# Patient Record
Sex: FEMALE | Race: BLACK OR AFRICAN AMERICAN | NOT HISPANIC OR LATINO | URBAN - METROPOLITAN AREA
[De-identification: names, ages, dates, MRNs, and addresses within clinical notes are randomized per-mention and may not be internally consistent; named-entity substitution may affect disease eponyms.]

---

## 2023-06-09 ENCOUNTER — TELEPHONE (OUTPATIENT)
Dept: PRIMARY CARE | Facility: CLINIC | Age: 43
End: 2023-06-09
Payer: COMMERCIAL

## 2023-06-09 DIAGNOSIS — E55.9 VITAMIN D DEFICIENCY: ICD-10-CM

## 2023-06-09 DIAGNOSIS — Z00.00 ROUTINE GENERAL MEDICAL EXAMINATION AT A HEALTH CARE FACILITY: Primary | ICD-10-CM

## 2023-06-22 ENCOUNTER — CLINICAL SUPPORT (OUTPATIENT)
Dept: PRIMARY CARE | Facility: CLINIC | Age: 43
End: 2023-06-22
Payer: COMMERCIAL

## 2023-06-22 DIAGNOSIS — E55.9 VITAMIN D DEFICIENCY: ICD-10-CM

## 2023-06-22 DIAGNOSIS — Z00.00 ROUTINE GENERAL MEDICAL EXAMINATION AT A HEALTH CARE FACILITY: ICD-10-CM

## 2023-06-22 LAB
ALANINE AMINOTRANSFERASE (SGPT) (U/L) IN SER/PLAS: 22 U/L (ref 7–45)
ALBUMIN (G/DL) IN SER/PLAS: 4.2 G/DL (ref 3.4–5)
ALKALINE PHOSPHATASE (U/L) IN SER/PLAS: 92 U/L (ref 33–110)
ANION GAP IN SER/PLAS: 17 MMOL/L (ref 10–20)
ASPARTATE AMINOTRANSFERASE (SGOT) (U/L) IN SER/PLAS: 24 U/L (ref 9–39)
BILIRUBIN TOTAL (MG/DL) IN SER/PLAS: 0.3 MG/DL (ref 0–1.2)
CALCIDIOL (25 OH VITAMIN D3) (NG/ML) IN SER/PLAS: 16 NG/ML
CALCIUM (MG/DL) IN SER/PLAS: 9.5 MG/DL (ref 8.6–10.6)
CARBON DIOXIDE, TOTAL (MMOL/L) IN SER/PLAS: 23 MMOL/L (ref 21–32)
CHLORIDE (MMOL/L) IN SER/PLAS: 107 MMOL/L (ref 98–107)
CHOLESTEROL (MG/DL) IN SER/PLAS: 178 MG/DL (ref 0–199)
CHOLESTEROL IN HDL (MG/DL) IN SER/PLAS: 51.7 MG/DL
CHOLESTEROL/HDL RATIO: 3.4
COBALAMIN (VITAMIN B12) (PG/ML) IN SER/PLAS: 345 PG/ML (ref 211–911)
CREATININE (MG/DL) IN SER/PLAS: 0.7 MG/DL (ref 0.5–1.05)
ERYTHROCYTE DISTRIBUTION WIDTH (RATIO) BY AUTOMATED COUNT: 19.7 % (ref 11.5–14.5)
ERYTHROCYTE MEAN CORPUSCULAR HEMOGLOBIN CONCENTRATION (G/DL) BY AUTOMATED: 30.2 G/DL (ref 32–36)
ERYTHROCYTE MEAN CORPUSCULAR VOLUME (FL) BY AUTOMATED COUNT: 75 FL (ref 80–100)
ERYTHROCYTES (10*6/UL) IN BLOOD BY AUTOMATED COUNT: 5.14 X10E12/L (ref 4–5.2)
GFR FEMALE: >90 ML/MIN/1.73M2
GLUCOSE (MG/DL) IN SER/PLAS: 93 MG/DL (ref 74–99)
HEMATOCRIT (%) IN BLOOD BY AUTOMATED COUNT: 38.8 % (ref 36–46)
HEMOGLOBIN (G/DL) IN BLOOD: 11.7 G/DL (ref 12–16)
LDL: 108 MG/DL (ref 0–99)
LEUKOCYTES (10*3/UL) IN BLOOD BY AUTOMATED COUNT: 8.9 X10E9/L (ref 4.4–11.3)
NRBC (PER 100 WBCS) BY AUTOMATED COUNT: 0 /100 WBC (ref 0–0)
PLATELETS (10*3/UL) IN BLOOD AUTOMATED COUNT: 557 X10E9/L (ref 150–450)
POTASSIUM (MMOL/L) IN SER/PLAS: 4.6 MMOL/L (ref 3.5–5.3)
PROTEIN TOTAL: 7.8 G/DL (ref 6.4–8.2)
SODIUM (MMOL/L) IN SER/PLAS: 142 MMOL/L (ref 136–145)
THYROTROPIN (MIU/L) IN SER/PLAS BY DETECTION LIMIT <= 0.05 MIU/L: 1.25 MIU/L (ref 0.44–3.98)
TRIGLYCERIDE (MG/DL) IN SER/PLAS: 90 MG/DL (ref 0–149)
UREA NITROGEN (MG/DL) IN SER/PLAS: 7 MG/DL (ref 6–23)
VLDL: 18 MG/DL (ref 0–40)

## 2023-06-22 PROCEDURE — 84443 ASSAY THYROID STIM HORMONE: CPT

## 2023-06-22 PROCEDURE — 85027 COMPLETE CBC AUTOMATED: CPT

## 2023-06-22 PROCEDURE — 82306 VITAMIN D 25 HYDROXY: CPT

## 2023-06-22 PROCEDURE — 80053 COMPREHEN METABOLIC PANEL: CPT

## 2023-06-22 PROCEDURE — 82607 VITAMIN B-12: CPT

## 2023-06-22 PROCEDURE — 83036 HEMOGLOBIN GLYCOSYLATED A1C: CPT

## 2023-06-22 PROCEDURE — 80061 LIPID PANEL: CPT

## 2023-06-23 LAB
ESTIMATED AVERAGE GLUCOSE FOR HBA1C: 123 MG/DL
HEMOGLOBIN A1C/HEMOGLOBIN TOTAL IN BLOOD: 5.9 %

## 2023-07-20 ENCOUNTER — OFFICE VISIT (OUTPATIENT)
Dept: PRIMARY CARE | Facility: CLINIC | Age: 43
End: 2023-07-20
Payer: COMMERCIAL

## 2023-07-20 VITALS
DIASTOLIC BLOOD PRESSURE: 100 MMHG | HEART RATE: 92 BPM | BODY MASS INDEX: 51.91 KG/M2 | OXYGEN SATURATION: 96 % | HEIGHT: 63 IN | SYSTOLIC BLOOD PRESSURE: 159 MMHG | WEIGHT: 293 LBS

## 2023-07-20 DIAGNOSIS — Z00.00 ANNUAL PHYSICAL EXAM: Primary | ICD-10-CM

## 2023-07-20 DIAGNOSIS — D64.9 ANEMIA, UNSPECIFIED TYPE: ICD-10-CM

## 2023-07-20 DIAGNOSIS — Z12.11 ENCOUNTER FOR SCREENING FOR MALIGNANT NEOPLASM OF COLON: ICD-10-CM

## 2023-07-20 DIAGNOSIS — R03.0 ELEVATED BP WITHOUT DIAGNOSIS OF HYPERTENSION: ICD-10-CM

## 2023-07-20 DIAGNOSIS — R73.03 PREDIABETES: ICD-10-CM

## 2023-07-20 DIAGNOSIS — E55.9 VITAMIN D DEFICIENCY: ICD-10-CM

## 2023-07-20 DIAGNOSIS — E66.01 CLASS 3 SEVERE OBESITY DUE TO EXCESS CALORIES WITH SERIOUS COMORBIDITY AND BODY MASS INDEX (BMI) OF 50.0 TO 59.9 IN ADULT (MULTI): ICD-10-CM

## 2023-07-20 DIAGNOSIS — Z12.31 VISIT FOR SCREENING MAMMOGRAM: ICD-10-CM

## 2023-07-20 DIAGNOSIS — Z23 ENCOUNTER FOR IMMUNIZATION: ICD-10-CM

## 2023-07-20 LAB
BASOPHILS (10*3/UL) IN BLOOD BY AUTOMATED COUNT: 0.05 X10E9/L (ref 0–0.1)
BASOPHILS/100 LEUKOCYTES IN BLOOD BY AUTOMATED COUNT: 0.5 % (ref 0–2)
EOSINOPHILS (10*3/UL) IN BLOOD BY AUTOMATED COUNT: 0.23 X10E9/L (ref 0–0.7)
EOSINOPHILS/100 LEUKOCYTES IN BLOOD BY AUTOMATED COUNT: 2.4 % (ref 0–6)
ERYTHROCYTE DISTRIBUTION WIDTH (RATIO) BY AUTOMATED COUNT: 19 % (ref 11.5–14.5)
ERYTHROCYTE MEAN CORPUSCULAR HEMOGLOBIN CONCENTRATION (G/DL) BY AUTOMATED: 30 G/DL (ref 32–36)
ERYTHROCYTE MEAN CORPUSCULAR VOLUME (FL) BY AUTOMATED COUNT: 75 FL (ref 80–100)
ERYTHROCYTES (10*6/UL) IN BLOOD BY AUTOMATED COUNT: 5.06 X10E12/L (ref 4–5.2)
FERRITIN (UG/LL) IN SER/PLAS: 50 UG/L (ref 8–150)
HEMATOCRIT (%) IN BLOOD BY AUTOMATED COUNT: 38 % (ref 36–46)
HEMOGLOBIN (G/DL) IN BLOOD: 11.4 G/DL (ref 12–16)
IMMATURE GRANULOCYTES/100 LEUKOCYTES IN BLOOD BY AUTOMATED COUNT: 0.3 % (ref 0–0.9)
IRON (UG/DL) IN SER/PLAS: 32 UG/DL (ref 35–150)
IRON BINDING CAPACITY (UG/DL) IN SER/PLAS: 429 UG/DL (ref 240–445)
IRON SATURATION (%) IN SER/PLAS: 7 % (ref 25–45)
LEUKOCYTES (10*3/UL) IN BLOOD BY AUTOMATED COUNT: 9.6 X10E9/L (ref 4.4–11.3)
LYMPHOCYTES (10*3/UL) IN BLOOD BY AUTOMATED COUNT: 2.14 X10E9/L (ref 1.2–4.8)
LYMPHOCYTES/100 LEUKOCYTES IN BLOOD BY AUTOMATED COUNT: 22.3 % (ref 13–44)
MONOCYTES (10*3/UL) IN BLOOD BY AUTOMATED COUNT: 0.55 X10E9/L (ref 0.1–1)
MONOCYTES/100 LEUKOCYTES IN BLOOD BY AUTOMATED COUNT: 5.7 % (ref 2–10)
NEUTROPHILS (10*3/UL) IN BLOOD BY AUTOMATED COUNT: 6.6 X10E9/L (ref 1.2–7.7)
NEUTROPHILS/100 LEUKOCYTES IN BLOOD BY AUTOMATED COUNT: 68.8 % (ref 40–80)
NRBC (PER 100 WBCS) BY AUTOMATED COUNT: 0 /100 WBC (ref 0–0)
PLATELETS (10*3/UL) IN BLOOD AUTOMATED COUNT: 523 X10E9/L (ref 150–450)

## 2023-07-20 PROCEDURE — 99396 PREV VISIT EST AGE 40-64: CPT | Performed by: FAMILY MEDICINE

## 2023-07-20 PROCEDURE — 84165 PROTEIN E-PHORESIS SERUM: CPT

## 2023-07-20 PROCEDURE — 85025 COMPLETE CBC W/AUTO DIFF WBC: CPT

## 2023-07-20 PROCEDURE — 84165 PROTEIN E-PHORESIS SERUM: CPT | Performed by: FAMILY MEDICINE

## 2023-07-20 PROCEDURE — 90715 TDAP VACCINE 7 YRS/> IM: CPT | Performed by: FAMILY MEDICINE

## 2023-07-20 PROCEDURE — 1036F TOBACCO NON-USER: CPT | Performed by: FAMILY MEDICINE

## 2023-07-20 PROCEDURE — 83550 IRON BINDING TEST: CPT

## 2023-07-20 PROCEDURE — 83540 ASSAY OF IRON: CPT

## 2023-07-20 PROCEDURE — 82728 ASSAY OF FERRITIN: CPT

## 2023-07-20 PROCEDURE — 83883 ASSAY NEPHELOMETRY NOT SPEC: CPT

## 2023-07-20 PROCEDURE — 3008F BODY MASS INDEX DOCD: CPT | Performed by: FAMILY MEDICINE

## 2023-07-20 PROCEDURE — 90471 IMMUNIZATION ADMIN: CPT | Performed by: FAMILY MEDICINE

## 2023-07-20 PROCEDURE — 84165 PROTEIN E-PHORESIS SERUM: CPT | Performed by: PATHOLOGY

## 2023-07-20 RX ORDER — SEMAGLUTIDE 0.25 MG/.5ML
0.25 INJECTION, SOLUTION SUBCUTANEOUS
Qty: 2 ML | Refills: 0 | Status: SHIPPED | OUTPATIENT
Start: 2023-07-20 | End: 2023-07-25 | Stop reason: DRUGHIGH

## 2023-07-20 ASSESSMENT — ENCOUNTER SYMPTOMS
OCCASIONAL FEELINGS OF UNSTEADINESS: 0
DEPRESSION: 0
LOSS OF SENSATION IN FEET: 0

## 2023-07-20 ASSESSMENT — COLUMBIA-SUICIDE SEVERITY RATING SCALE - C-SSRS
6. HAVE YOU EVER DONE ANYTHING, STARTED TO DO ANYTHING, OR PREPARED TO DO ANYTHING TO END YOUR LIFE?: NO
1. IN THE PAST MONTH, HAVE YOU WISHED YOU WERE DEAD OR WISHED YOU COULD GO TO SLEEP AND NOT WAKE UP?: NO
2. HAVE YOU ACTUALLY HAD ANY THOUGHTS OF KILLING YOURSELF?: NO

## 2023-07-20 ASSESSMENT — PATIENT HEALTH QUESTIONNAIRE - PHQ9
SUM OF ALL RESPONSES TO PHQ9 QUESTIONS 1 AND 2: 0
1. LITTLE INTEREST OR PLEASURE IN DOING THINGS: NOT AT ALL
2. FEELING DOWN, DEPRESSED OR HOPELESS: NOT AT ALL

## 2023-07-20 NOTE — PROGRESS NOTES
"Subjective   Patient ID: Ernestina Naylor is a 43 y.o. female who presents for Annual Exam (Painful Cramps).    Last Annual Physical: Jan 2021   Last Dental Visit: No recent dental visit   Last Eye exam: No recent eye exam  Hearing Concerns: No   Diet: Well- balanced   Exercise Routine: Some walking      HPI   The patient reports that she is not on any prescribed medications at this time. She mentions that her depression has improved however, her blood pressures are elevated. She states that she has been walking regularly and is still having a difficult time losing weight. The patient has tried Weight Watchers but did not like it. She also complains of abdominal pain which usually comes 14 days before her menstrual cycle. She is not sure if this may be associated with her ovulating.     Review of Systems  Constitutional: No fever or chills, No Night Sweats  Eyes: No Blurry Vision or Eye sight problems  ENT: No Nasal Discharge, Hoarseness, sore throat  Cardiovascular: no chest pain, no palpitations and no syncope.   Respiratory: no cough, no shortness of breath during exertion and no shortness of breath at rest.   Gastrointestinal: + abdominal pain, no nausea and no vomiting.   : No vaginal discharge, burning with urination, no blood in urine or stools  Skin: No Skin rashes or Lesions  Neuro: No Headache, no dizziness or Numbness or tingling  Psych: No Anxiety, depression or sleeping problems  Heme: No Easy bleeding or brusing.     Objective   BP (!) 159/100   Pulse 92   Ht 1.6 m (5' 3\")   Wt (!) 151 kg (332 lb)   LMP 07/05/2023 (Exact Date)   SpO2 96%   BMI 58.81 kg/m²     Physical Exam  Patient declined Chaperone  Constitutional: Alert and in no acute distress. Well developed, well nourished.   Head and Face: Head and face: Normal.    Eyes: Normal external exam. Pupils were equal in size, round, reactive to light (PERRL) with normal accommodation and extraocular movements intact (EOMI).   Ears, Nose, Mouth, and " Throat: External inspection of ears and nose: Normal.  Hearing: Normal.  Nasal mucosa, septum, and turbinates: Normal.  Lips, teeth, and gums: Normal.  Oropharynx: Normal.   Neck: No neck mass was observed. Supple. Thyroid not enlarged and there were no palpable thyroid nodules.   Cardiovascular: Heart rate and rhythm were normal, normal S1 and S2. Pedal pulses: Normal. No peripheral edema.   Pulmonary: No respiratory distress. Clear bilateral breath sounds.   Abdomen: Soft nontender; no abdominal mass palpated. Normal bowel sounds. No organomegaly.   Musculoskeletal: No joint swelling seen, normal movements of all extremities. Range of motion: Normal.  Muscle strength/tone: Normal.    Skin: Normal skin color and pigmentation, normal skin turgor, and no rash.   Neurologic: Deep tendon reflexes were 2+ and symmetric.   Psychiatric: Judgment and insight: Intact. Mood and affect: Normal.  Lymphatic: No cervical lymphadenopathy. Palpation of lymph nodes in axillae: Normal.  Palpation of lymph nodes in groin: Normal.    Lab Results   Component Value Date    WBC 8.9 06/22/2023    HGB 11.7 (L) 06/22/2023    HCT 38.8 06/22/2023     (H) 06/22/2023    CHOL 178 06/22/2023    TRIG 90 06/22/2023    HDL 51.7 06/22/2023    ALT 22 06/22/2023    AST 24 06/22/2023     06/22/2023    K 4.6 06/22/2023     06/22/2023    CREATININE 0.70 06/22/2023    BUN 7 06/22/2023    CO2 23 06/22/2023    TSH 1.25 06/22/2023    HGBA1C 5.9 (A) 06/22/2023       BI mammo bilateral screening tomosynthesis  Narrative: Interpreted By:  DENISE BLISS MD  MRN: [id number]  Patient Name: JEFF CHAVES     STUDY:  DIGITAL MAMM SCREENING W/ MAMADOU;  6/22/2023 9:07 am     ACCESSION NUMBER(S):  [id number]     ORDERING CLINICIAN:  DINA MANN     INDICATION:  Screening.     COMPARISON:  02/11/2022     FINDINGS:  2D and tomosynthesis images were reviewed at 1 mm slice thickness.     The breast tissue is almost entirely fatty.   No suspicious masses  or  calcifications are identified.     Impression: No mammographic evidence of malignancy.     BI-RADS CATEGORY:     Category: 1 - Negative.  Recommendation: 1 Year Screening.     For any future breast imaging appointments, please call [phone]  ([phone]).                  Assessment/Plan   Diagnoses and all orders for this visit:  Annual physical exam  Class 3 severe obesity due to excess calories with serious comorbidity and body mass index (BMI) of 50.0 to 59.9 in adult (CMS/[facility])  -     semaglutide, weight loss, (Wegovy) 0.25 mg/0.5 mL pen injector; Inject 0.25 mg under the skin 1 (one) time per week for 4 doses.  -     Referral to Comprehensive Weight Management; Future  Encounter for immunization  -     Tdap vaccine, age 10 years and older  (BOOSTRIX)  Vitamin D deficiency  Visit for screening mammogram  -     BI mammo bilateral screening tomosynthesis; Future  Encounter for screening for malignant neoplasm of colon  -     Colonoscopy; Future  Prediabetes  -     semaglutide, weight loss, (Wegovy) 0.25 mg/0.5 mL pen injector; Inject 0.25 mg under the skin 1 (one) time per week for 4 doses.  Elevated BP without diagnosis of hypertension  -     Follow Up In [facility] - PCP - Established; Future  Anemia, unspecified type  -     Ferritin; Future  -     Iron and TIBC; Future  -     CBC and Auto Differential; Future  -     [place]/Lambda Free Light Chain, Serum; Future  -     Serum Protein Electrophoresis; Future        Dear Ernestina Naylor     It was my pleasure to take care of you today in the office. Below are the things we discussed today:    1. 1. Immunizations: Yearly Flu shot is recommended.          a: COVID: Patient declined booster          b: Tetanus: Given today    2. Blood Work: Reviewed   3. Seen your dentist twice a year  4. Yearly Eye exam is recommended    5. BMI: Obese  6: Diet recommendations:   Eat Clean, Try to have as many home cooked meals as possible  Avoid processed foods which contain  excess calories, sugar, and sodium.    7. Exercise recommendations:   150 minutes a week to maintain your weight     If you have to loose weight, you need a better diet and exercise plan.     8. Supplements recommended:  a - Calcium 600 mg up to twice a day to get a total of 1200 mg. Each 8 oz of milk or yogurt or 1 oz of cheese, 1 Banana, 1 serving of green Leafy vegetable has about 300 mg of Calcium, so you may subtract that amount. Calcium citrate is the only acceptable supplement to take if you take an acid suppressing medication like Prilosec; otherwise Calcium carbonate is acceptable too (It can cause Constipation).   b - Vitamin D - 2000 IU daily     9. Please get your Living will / Advance directive completed if you do not have one already. Please make sure our office has a copy of the latest one.     10. Colonoscopy: Ordered  11. Mammogram: Uptodate, ordered   12. PAP: Last PAP done in Jan 2022. Cytology and HPV negative     13. Prediabetes and obesity: Nutritionist referral. Portion control and a well-balanced diet are encouraged. Advised the patient to start meal planning, avoid consuming take out and eat healthy snacks. Use [employer] to log meals. Start Wejovy.     14. Mittleschmerz: Use over-the-counter Advil.     15. Elevated blood pressures: Advised the patient to measure ambulatory blood pressures at home regularly.    Follow up in 1 month.    Follow up in one year for a Physical. Please call the office before your Physical to see if you need blood work completed prior to your physical.     Please call me if any questions arise from now until your next visit. I will call you after I am done seeing patients. A Doctor is always available by phone when the office is closed. Please feel free to call for help with any problem that you feel shouldn't wait until the office re-opens.     Scribe Attestation  By signing my name below, ICecelia Scribe   attest that this documentation has been  prepared under the direction and in the presence of Mackenzie Escamilla MD.

## 2023-07-21 LAB
IMMUNOGLOBULIN LIGHT CHAINS KAPPA/LAMBDA (MASS RATIO) IN SERUM: 1.91 (ref 0.26–1.65)
IMMUNOGLOBULIN LIGHT CHAINS.KAPPA (MG/DL) IN SERUM: 2.68 MG/DL (ref 0.33–1.94)
IMMUNOGLOBULIN LIGHT CHAINS.LAMBDA (MG/DL) IN SERUM: 1.4 MG/DL (ref 0.57–2.63)

## 2023-07-23 LAB
ALBUMIN ELP: 3.9 G/DL (ref 3.4–5)
ALPHA 1: 0.4 G/DL (ref 0.2–0.6)
ALPHA 2: 0.9 G/DL (ref 0.4–1.1)
BETA: 1 G/DL (ref 0.5–1.2)
GAMMA GLOBULIN: 1.5 G/DL (ref 0.5–1.4)
PATH REVIEW-SERUM PROTEIN ELECTROPHORESIS: NORMAL
PROTEIN ELECTROPHORESIS INTERPRETATION: ABNORMAL
PROTEIN TOTAL: 7.6 G/DL (ref 6.4–8.2)

## 2023-07-24 ENCOUNTER — TELEPHONE (OUTPATIENT)
Dept: PRIMARY CARE | Facility: CLINIC | Age: 43
End: 2023-07-24
Payer: COMMERCIAL

## 2023-07-24 ENCOUNTER — PATIENT MESSAGE (OUTPATIENT)
Dept: PRIMARY CARE | Facility: CLINIC | Age: 43
End: 2023-07-24
Payer: COMMERCIAL

## 2023-07-24 DIAGNOSIS — E66.01 CLASS 3 SEVERE OBESITY DUE TO EXCESS CALORIES WITH SERIOUS COMORBIDITY AND BODY MASS INDEX (BMI) OF 50.0 TO 59.9 IN ADULT (MULTI): Primary | ICD-10-CM

## 2023-07-24 DIAGNOSIS — D47.2 MGUS (MONOCLONAL GAMMOPATHY OF UNKNOWN SIGNIFICANCE): Primary | ICD-10-CM

## 2023-07-24 NOTE — TELEPHONE ENCOUNTER
From: Ernestina Naylor  To: Mackenzie Escamilla MD  Sent: 7/24/2023 1:47 PM EDT  Subject: Nutrition     I was told I couldn't make an appointment with the weight-loss doctor until I had an referral to see the nutritionist first. Is it possible for me to get a referral for that

## 2023-07-25 RX ORDER — NALTREXONE HYDROCHLORIDE AND BUPROPION HYDROCHLORIDE 8; 90 MG/1; MG/1
TABLET, EXTENDED RELEASE ORAL
Qty: 54 TABLET | Refills: 0 | Status: SHIPPED | OUTPATIENT
Start: 2023-07-25 | End: 2023-07-28 | Stop reason: SDUPTHER

## 2023-07-27 ENCOUNTER — PATIENT MESSAGE (OUTPATIENT)
Dept: PRIMARY CARE | Facility: CLINIC | Age: 43
End: 2023-07-27
Payer: COMMERCIAL

## 2023-07-27 DIAGNOSIS — E66.01 CLASS 3 SEVERE OBESITY DUE TO EXCESS CALORIES WITH SERIOUS COMORBIDITY AND BODY MASS INDEX (BMI) OF 50.0 TO 59.9 IN ADULT (MULTI): ICD-10-CM

## 2023-07-28 RX ORDER — NALTREXONE HYDROCHLORIDE AND BUPROPION HYDROCHLORIDE 8; 90 MG/1; MG/1
TABLET, EXTENDED RELEASE ORAL
Qty: 54 TABLET | Refills: 0 | Status: SHIPPED | OUTPATIENT
Start: 2023-07-28 | End: 2023-08-24

## 2023-07-28 RX ORDER — NALTREXONE HYDROCHLORIDE AND BUPROPION HYDROCHLORIDE 8; 90 MG/1; MG/1
1 TABLET, EXTENDED RELEASE ORAL 2 TIMES DAILY
Qty: 60 TABLET | Refills: 0 | Status: SHIPPED | OUTPATIENT
Start: 2023-07-28 | End: 2023-08-24

## 2023-08-24 ENCOUNTER — OFFICE VISIT (OUTPATIENT)
Dept: PRIMARY CARE | Facility: CLINIC | Age: 43
End: 2023-08-24
Payer: COMMERCIAL

## 2023-08-24 VITALS
BODY MASS INDEX: 51.91 KG/M2 | HEIGHT: 63 IN | OXYGEN SATURATION: 98 % | DIASTOLIC BLOOD PRESSURE: 83 MMHG | HEART RATE: 89 BPM | SYSTOLIC BLOOD PRESSURE: 135 MMHG | WEIGHT: 293 LBS

## 2023-08-24 DIAGNOSIS — D89.89 KAPPA LIGHT CHAIN DISEASE (MULTI): ICD-10-CM

## 2023-08-24 DIAGNOSIS — E66.01 CLASS 3 SEVERE OBESITY DUE TO EXCESS CALORIES WITH SERIOUS COMORBIDITY AND BODY MASS INDEX (BMI) OF 50.0 TO 59.9 IN ADULT (MULTI): Primary | ICD-10-CM

## 2023-08-24 DIAGNOSIS — R03.0 ELEVATED BP WITHOUT DIAGNOSIS OF HYPERTENSION: ICD-10-CM

## 2023-08-24 LAB
C REACTIVE PROTEIN (MG/L) IN SER/PLAS: 5.24 MG/DL
CITRULLINE ANTIBODY, IGG: <1 U/ML
RHEUMATOID FACTOR (IU/ML) IN SERUM OR PLASMA: <10 IU/ML (ref 0–15)
SEDIMENTATION RATE, ERYTHROCYTE: 72 MM/H (ref 0–20)

## 2023-08-24 PROCEDURE — 1036F TOBACCO NON-USER: CPT | Performed by: FAMILY MEDICINE

## 2023-08-24 PROCEDURE — 99213 OFFICE O/P EST LOW 20 MIN: CPT | Performed by: FAMILY MEDICINE

## 2023-08-24 PROCEDURE — 85652 RBC SED RATE AUTOMATED: CPT

## 2023-08-24 PROCEDURE — 3008F BODY MASS INDEX DOCD: CPT | Performed by: FAMILY MEDICINE

## 2023-08-24 PROCEDURE — 86235 NUCLEAR ANTIGEN ANTIBODY: CPT

## 2023-08-24 PROCEDURE — 86039 ANTINUCLEAR ANTIBODIES (ANA): CPT

## 2023-08-24 PROCEDURE — 86225 DNA ANTIBODY NATIVE: CPT

## 2023-08-24 PROCEDURE — 86038 ANTINUCLEAR ANTIBODIES: CPT

## 2023-08-24 PROCEDURE — 86431 RHEUMATOID FACTOR QUANT: CPT

## 2023-08-24 PROCEDURE — 86140 C-REACTIVE PROTEIN: CPT

## 2023-08-24 PROCEDURE — 86200 CCP ANTIBODY: CPT

## 2023-08-24 RX ORDER — CHOLECALCIFEROL (VITAMIN D3) 50 MCG
50 TABLET ORAL DAILY
COMMUNITY

## 2023-08-24 ASSESSMENT — PATIENT HEALTH QUESTIONNAIRE - PHQ9
SUM OF ALL RESPONSES TO PHQ9 QUESTIONS 1 AND 2: 0
2. FEELING DOWN, DEPRESSED OR HOPELESS: NOT AT ALL
1. LITTLE INTEREST OR PLEASURE IN DOING THINGS: NOT AT ALL

## 2023-08-24 NOTE — PROGRESS NOTES
"Subjective   Patient ID: Ernestina Naylor is a 43 y.o. female who presents for Follow-up.    HPI   The patient reports that she has not been checking her blood pressures at home because she has not gotten a machine yet. Currently, she is not on any prescribed medications and the Wejovy was not covered by her insurance so she will continue to work with her nutritionist. She will follow up with rheumatology for her light chain disease.     Review of Systems  Constitutional: No fever or chills  Cardiovascular: no chest pain, no palpitations and no syncope.   Respiratory: no cough, no shortness of breath during exertion and no shortness of breath at rest.   Gastrointestinal: no abdominal pain, no nausea and no vomiting.  Neuro: No Headache, no dizziness    Objective   /83   Pulse 89   Ht 1.6 m (5' 3\")   Wt 147 kg (324 lb)   SpO2 98%   BMI 57.39 kg/m²     Physical Exam  Constitutional: Alert and in no acute distress. Well developed, well nourished  Head and Face: Head and face: Normal.    Cardiovascular: Heart rate and rhythm were normal, normal S1 and S2. No peripheral edema.   Pulmonary: No respiratory distress. Clear bilateral breath sounds.  Musculoskeletal: Gait and station: Normal. Muscle strength/tone: Normal.   Skin: Normal skin color and pigmentation, normal skin turgor, and no rash.    Psychiatric: Judgment and insight: Intact. Mood and affect: Normal.    Lab Results   Component Value Date    WBC 9.6 07/20/2023    HGB 11.4 (L) 07/20/2023    HCT 38.0 07/20/2023     (H) 07/20/2023    CHOL 178 06/22/2023    TRIG 90 06/22/2023    HDL 51.7 06/22/2023    ALT 22 06/22/2023    AST 24 06/22/2023     06/22/2023    K 4.6 06/22/2023     06/22/2023    CREATININE 0.70 06/22/2023    BUN 7 06/22/2023    CO2 23 06/22/2023    TSH 1.25 06/22/2023    HGBA1C 5.9 (A) 06/22/2023       BI mammo bilateral screening tomosynthesis  Narrative: Interpreted By:  DENISE BLISS MD  MRN: [id number]  Patient Name: , " JEFF     STUDY:  DIGITAL MAMM SCREENING W/ MAMADOU;  6/22/2023 9:07 am     ACCESSION NUMBER(S):  [id number]     ORDERING CLINICIAN:  DINA MANN     INDICATION:  Screening.     COMPARISON:  02/11/2022     FINDINGS:  2D and tomosynthesis images were reviewed at 1 mm slice thickness.     The breast tissue is almost entirely fatty.   No suspicious masses or  calcifications are identified.     Impression: No mammographic evidence of malignancy.     BI-RADS CATEGORY:     Category: 1 - Negative.  Recommendation: 1 Year Screening.     For any future breast imaging appointments, please call [phone]).                  Assessment/Plan   Diagnoses and all orders for this visit:  Class 3 severe obesity due to excess calories with serious comorbidity and body mass index (BMI) of 50.0 to 59.9 in adult (CMS/[facility])  Elevated BP without diagnosis of hypertension  -     Follow Up In Advanced Primary Care - PCP - Established  Kappa light chain disease (CMS/[facility])  -     Referral to Rheumatology; Future  -     XAVIER with Reflex to PÉREZ; Future  -     Citrulline Antibody, IgG; Future  -     C-Reactive Protein; Future  -     Rheumatoid Factor; Future  -     Sedimentation Rate; Future        Dear Jeff Naylor     It was my pleasure to take care of you today in the office. Below are the things we discussed today:    1.  Prediabetes and obesity: Encouraged the patient to work with a nutritionist.      2. Elevated blood pressures: Advised the patient to get a blood pressure machine and measure ambulatory blood pressures at home regularly. If blood pressures are consistently greater than 130 systolic please let me know.    3. Light chain disease: Scheduled with Hematology but they advised the patient to follow up with Rheumatology. Rheumatology referral.    Your yearly Physical is due in: July 2024   When you call the office for your yearly Physical, please ask them to inform me to order your blood work, so that you can get the fasting  blood work before your appointment and we can discuss the results at your physical.      Please call me if any questions arise from now until your next visit. I will call you after I am done seeing patients. A Doctor is always available by phone when the office is closed. Please feel free to call for help with any problem that you feel shouldn't wait until the office re-opens.     Scribe Attestation  By signing my name below, I, Anais Garcia   attest that this documentation has been prepared under the direction and in the presence of Mackenzie Escamilla MD.

## 2023-08-26 LAB
ANA PATTERN: ABNORMAL
ANA TITER: ABNORMAL
ANTI-CENTROMERE: <0.2 AI
ANTI-CHROMATIN: <0.2 AI
ANTI-DNA (DS): <1 IU/ML
ANTI-JO-1 IGG: <0.2 AI
ANTI-NUCLEAR ANTIBODY (ANA): POSITIVE
ANTI-RIBOSOMAL P: <0.2 AI
ANTI-RNP: <0.2 AI
ANTI-SCL-70: <0.2 AI
ANTI-SM/RNP: <0.2 AI
ANTI-SM: <0.2 AI
ANTI-SSA: <0.2 AI
ANTI-SSB: <0.2 AI

## 2023-09-29 LAB
APPEARANCE, URINE: NORMAL
BILIRUBIN, URINE: NEGATIVE
BLOOD, URINE: NEGATIVE
C REACTIVE PROTEIN (MG/L) IN SER/PLAS: 3.75 MG/DL
COLOR, URINE: YELLOW
COMPLEMENT C3 (MG/DL) IN SER/PLAS: 190 MG/DL (ref 87–200)
COMPLEMENT C4 (MG/DL) IN SER/PLAS: 58 MG/DL (ref 10–50)
CREATININE (MG/DL) IN URINE: 140 MG/DL (ref 20–320)
GLUCOSE, URINE: NEGATIVE MG/DL
KETONES, URINE: NEGATIVE MG/DL
LEUKOCYTE ESTERASE, URINE: NEGATIVE
NITRITE, URINE: NEGATIVE
PH, URINE: 5 (ref 5–8)
PROTEIN (MG/DL) IN URINE: 12 MG/DL (ref 5–24)
PROTEIN, URINE: NEGATIVE MG/DL
PROTEIN/CREATININE (MG/MG) IN URINE: 0.09 MG/MG CREAT (ref 0–0.17)
RBC, URINE: ABNORMAL /HPF (ref 0–5)
SEDIMENTATION RATE, ERYTHROCYTE: 22 MM/H (ref 0–20)
SPECIFIC GRAVITY, URINE: 1.02 (ref 1–1.03)
UROBILINOGEN, URINE: <2 MG/DL (ref 0–1.9)
WBC, URINE: ABNORMAL /HPF (ref 0–5)

## 2023-11-14 ENCOUNTER — APPOINTMENT (OUTPATIENT)
Dept: SURGERY | Facility: CLINIC | Age: 43
End: 2023-11-14
Payer: COMMERCIAL

## 2023-12-18 ENCOUNTER — PATIENT MESSAGE (OUTPATIENT)
Dept: PRIMARY CARE | Facility: CLINIC | Age: 43
End: 2023-12-18
Payer: COMMERCIAL

## 2023-12-18 DIAGNOSIS — E66.01 CLASS 3 SEVERE OBESITY DUE TO EXCESS CALORIES WITH SERIOUS COMORBIDITY AND BODY MASS INDEX (BMI) OF 50.0 TO 59.9 IN ADULT (MULTI): Primary | ICD-10-CM

## 2024-04-08 ENCOUNTER — OFFICE VISIT (OUTPATIENT)
Dept: ENDOCRINOLOGY | Facility: CLINIC | Age: 44
End: 2024-04-08
Payer: COMMERCIAL

## 2024-04-08 VITALS
WEIGHT: 293 LBS | TEMPERATURE: 97.1 F | SYSTOLIC BLOOD PRESSURE: 125 MMHG | HEART RATE: 88 BPM | DIASTOLIC BLOOD PRESSURE: 81 MMHG | HEIGHT: 63 IN | BODY MASS INDEX: 51.91 KG/M2

## 2024-04-08 DIAGNOSIS — E66.8 CONSTITUTIONAL OBESITY: Primary | ICD-10-CM

## 2024-04-08 DIAGNOSIS — Z76.89 ENCOUNTER FOR WEIGHT MANAGEMENT: ICD-10-CM

## 2024-04-08 DIAGNOSIS — Z00.00 HEALTHCARE MAINTENANCE: ICD-10-CM

## 2024-04-08 DIAGNOSIS — R73.03 PRE-DIABETES: ICD-10-CM

## 2024-04-08 LAB — POC FINGERSTICK BLOOD GLUCOSE: 6 MG/DL (ref 70–100)

## 2024-04-08 PROCEDURE — 99204 OFFICE O/P NEW MOD 45 MIN: CPT | Performed by: INTERNAL MEDICINE

## 2024-04-08 PROCEDURE — 82962 GLUCOSE BLOOD TEST: CPT | Performed by: INTERNAL MEDICINE

## 2024-04-08 PROCEDURE — 3008F BODY MASS INDEX DOCD: CPT | Performed by: INTERNAL MEDICINE

## 2024-04-08 NOTE — PROGRESS NOTES
Patient is seen at the request of Dr. Escamilla for my opinion regarding weight management. My final recommendations will be communicated back to the requesting provider by way of shared medical record.    NEW  Subjective   Ernestina Naylor is a 43 y.o. female with a hx of arthralgias, PCOS, HTN, pre-DM, intermittent palpitations who presents for weight management and obesity.    1. Weight history: Weight became an issue after having first child 20 years ago. Weight has gradually increased each year.   --Any previous program: Patient tried WW- no success. Patient did OMAD diet 4 years ago- lost 60lbs, then put back on plus some.     2. Sleep: trouble getting to sleep. Average 5 hours per night     3. Stress: 5/10, , 3 kids, not working      4. Exercise: Has been walking 2 miles per day since January 5. Appetite control: controlled  Breakfast: usually skips  Lunch: pre-made chicken salad/baked chicken with mashed potatoes and string beans   Dinner: protein with vegetable and carb   Snacks: kettle popcorn/banana pudding/    Any personal history of pancreatitis?: no  Any personal or family history of medullary thyroid cancer or MEN (multiple endocrine neoplasia)?: no    4/8/24: [id number]=0.0%      Current Outpatient Medications:     cholecalciferol (Vitamin D3) 50 MCG (2000 UT) tablet, Take 1 tablet (50 mcg) by mouth once daily., Disp: , Rfl:     ELDERBERRY FRUIT ORAL, Take by mouth., Disp: , Rfl:     magnesium 30 mg tablet, Take 1 tablet (30 mg) by mouth once daily., Disp: , Rfl:     ROS:  System: normal  Eyes : no visual changes  Neck : no tenderness, no new lumps/bumps  Respiratory : no SOB  Cardiovascular : no chest pain, no palpitations  Gastro-Intestinal : no abdominal concerns  Neurological : no numbness or tingling in the extremities  Musculoskeletal : no joint paint, no muscle pain  Skin : no unusual rashes  Psychiatric : no depression, no anxiety  See HPI for Endocrine ROS    Past Medical History:  "  Diagnosis Date    Body mass index (BMI) 50.0-59.9, adult (CMS/[facility]) 2018    BMI 50.0-59.9, adult       Past Surgical History:   Procedure Laterality Date     SECTION, LOW TRANSVERSE  3/17/2002 2006 2009    OTHER SURGICAL HISTORY  2022     section    OTHER SURGICAL HISTORY  2022    Colposcopy    OTHER SURGICAL HISTORY  2022    Tubal ligation    TUBAL LIGATION  2009       Social History     Socioeconomic History    Marital status:      Spouse name: Not on file    Number of children: 3    Years of education: Not on file    Highest education level: Not on file   Occupational History    Not on file   Tobacco Use    Smoking status: Never     Passive exposure: Past    Smokeless tobacco: Never   Substance and Sexual Activity    Alcohol use: Yes     Alcohol/week: 3.0 standard drinks of alcohol     Types: 3 Glasses of wine per week     Comment: Social drinking/weekends    Drug use: Never    Sexual activity: Yes     Partners: Male     Birth control/protection: Female Sterilization   Other Topics Concern    Not on file   Social History Narrative    Not on file     Social Determinants of Health     Financial Resource Strain: Not on file   Food Insecurity: Not on file   Transportation Needs: Not on file   Physical Activity: Not on file   Stress: Not on file   Social Connections: Not on file   Intimate Partner Violence: Not on file   Housing Stability: Not on file       Objective    Physical Exam:  Blood pressure 125/81, pulse 88, temperature 36.2 °C (97.1 °F), height 1.6 m (5' 3\"), weight 146 kg (321 lb 9.6 oz).  General : alert and oriented X3, no acute distress  Eyes : EOMI   Neck : non tender, supple  Thyroid : enlarged, no palpable nodules  Heart : regular rate and rhythm  ABD : no obvious abnormalities, soft to palpation  Extremities : no edema  Neuro : normal  Other :    Assessment/Plan   Ernestina Naylor is a 43 y.o. female with a hx of arthralgias, PCOS, HTN, " pre-DM, intermittent palpitations who presents for weight management and obesity.    1. Weight Management : Reviewed the principles of energy metabolism, caloric intake and expenditure, and rationale for a treatment program.  Also reinforced need for reduced calorie, low fat diet and increased physical activity.    We reviewed the possibility of starting an interdisciplinary lifestyle intervention program involving improvement of the diet, a personalized exercise program, efforts to reduce the stress and the possibility of using appetite suppressant medications in an effort to help with the weight loss process.  The patient expressed interest in the plan.    2. Nutrition : I discussed trying one of the diet approaches we have here in the program : Mediterranean lifestyle, ketosis diet.  The patient will be referred to the Registered Dietician for education on their diet of choice.  The dietary booklet was provided in the visit today.    4/8/24: 321lb, 56.97kg/m2    3. Sleep : trouble getting to sleep. Average 5 hours per night   Consult to Sleep Medicine    4. Stress : 5/10, , 3 kids, not working    5. Exercise : Has been walking 2 miles per day since January      6. Appetite : is high  Discussed AOM's  4/8/24: 6% - prediabetes    7. Thyroid US in radiology : baseline.    Follow up in a group visit.  Gibran Saenz MD

## 2024-04-08 NOTE — PATIENT INSTRUCTIONS
Consult to Annmarie --? Today  Consult to sleep medicine  Follow up in a group visit    Thyroid US in radiology

## 2024-04-09 ENCOUNTER — NUTRITION (OUTPATIENT)
Dept: ENDOCRINOLOGY | Facility: CLINIC | Age: 44
End: 2024-04-09
Payer: COMMERCIAL

## 2024-04-09 VITALS — BODY MASS INDEX: 51.91 KG/M2 | WEIGHT: 293 LBS | HEIGHT: 63 IN

## 2024-04-09 DIAGNOSIS — Z71.3 DIETARY COUNSELING: ICD-10-CM

## 2024-04-09 PROCEDURE — 97802 MEDICAL NUTRITION INDIV IN: CPT | Performed by: DIETITIAN, REGISTERED

## 2024-04-09 NOTE — PATIENT INSTRUCTIONS
- Please refer to your book entitled: Your Mediterranean Meal Plan, and follow Mediterranean Diet eating guidelines as reviewed.  - The Healthy Plate style of eating can be a helpful tool for incorporating healthy balanced meals in appropriate portions. (Healthy Plate: Start with a 9-inch diameter plate. Fill 1/2 the plate with non-starchy vegetables, 1/4 of the plate with whole grains or starchy vegetables, and 1/4 of the plate with a lean source of protein.   - Consider pre-planning healthy meals for the week. Refer to your book for both menu and recipe ideas to get you started.  - Further recipes can also be found at: [email or web address]  - Incorporate strategies of mindful eating every day. Practice staying in tune with your body's hunger cues and eat only when truly hungry. Avoid emotional eating/eating when not hungry.  - Keep up the great work with your fluids, aiming for at least 64 ounces of water daily.  - Aim for 150 minutes of moderate-intensity physical activity per week. Can consider adding in two days weekly of resistance training.  - Follow-up as scheduled for the group classes with Dr. Nury Saenz.  - Follow-up with nutrition as scheduled.

## 2024-04-09 NOTE — PROGRESS NOTES
"Initial Nutrition Assessment    Patient was referred to nutrition by Dr. Nury Saenz for weight management/desire to lose weight. Other PMHX significant for Arthralgias, PCOS, HTN and Pre-DM.     Diet recall reveals a meal pattern with 2-3 meals and 102 snacks daily with likely larger portions than required for desired weight loss at this time. Fluids meeting recommendations in type and amount with water as primary beverage. Pt is incorporating consistent physical activity, meeting weekly recommendations with walking, but would likely benefit from adding in resistance training to assist I achieving goals. See all interventions/recommendations below as discussed during visit this day.      Patient reported symptoms: Difficulty losing weight    Anthropometrics:  Height:   Ht Readings from Last 1 Encounters:   04/08/24 1.6 m (5' 3\")      Weight:   Wt Readings from Last 10 Encounters:   04/08/24 146 kg (321 lb 9.6 oz)   08/24/23 147 kg (324 lb)   07/20/23 (!) 151 kg (332 lb)   02/25/22 (!) 142 kg (313 lb)   01/28/22 (!) 146 kg (322 lb)      Current BMI:   BMI Readings from Last 1 Encounters:   04/08/24 56.97 kg/m²        Labs:  Lab Results   Component Value Date    HGBA1C 5.9 (A) 06/22/2023      Lab Results   Component Value Date    CHOL 178 06/22/2023    CHOL 169 01/28/2022     Lab Results   Component Value Date    HDL 51.7 06/22/2023    HDL 48.0 01/28/2022     No results found for: \"LDLCALC\"  Lab Results   Component Value Date    TRIG 90 06/22/2023    TRIG 84 01/28/2022       Malnutrition Screening:  Significant Unintentional weight loss: No  Eating less than 75% of usual intake for more than 2 weeks: No  Potential Signs of Inflammation: No    Recommended Malnutrition Diagnosis: No malnutrition identified    Diet Recall-  Breakfast- skips or has man with cheese and toast  Lunch- salad with chicken OR chicken, potatoes and green beans  Dinner- various proteins, starches and non-starchy vegetables  Daily Snacks- " popcorn or muffin  Beverages- water throughout the day (120 ounces daily) OR dragon fruit drink (1 cup daily at 50 calories)   Alcohol- socially only wine   Physical Activity- walking two miles daily    Nutrition Diagnosis: Food and nutrition-related knowledge deficit related to lack of prior exposure to information as evidenced by  verbalizes incomplete information .    Readiness to Learn:  Cognitive ability: Alert and oriented  Motivation to learn: Interested  Family Support: High- Very involved in patient care  Instruction provided to: Patient and Spouse  Patient learns best by: Multiple methods  Factors affecting learning: None   Physical limitations affecting learning: None    Education Materials Provided:   Your Mediterranean Meal Plan Booklet provided during recent visit with Dr. Nury Saenz and reviewed during visit this day.    Nutrition Interventions/Recommendations for 4/9/2024:  - Please refer to your book entitled: Your Mediterranean Meal Plan, and follow Mediterranean Diet eating guidelines as reviewed.  - The Healthy Plate style of eating can be a helpful tool for incorporating healthy balanced meals in appropriate portions. (Healthy Plate: Start with a 9-inch diameter plate. Fill 1/2 the plate with non-starchy vegetables, 1/4 of the plate with whole grains or starchy vegetables, and 1/4 of the plate with a lean source of protein.   - Consider pre-planning healthy meals for the week. Refer to your book for both menu and recipe ideas to get you started.  - Further recipes can also be found at: [email or web address]  - Incorporate strategies of mindful eating every day. Practice staying in tune with your body's hunger cues and eat only when truly hungry. Avoid emotional eating/eating when not hungry.  - Keep up the great work with your fluids, aiming for at least 64 ounces of water daily.  - Aim for 150 minutes of moderate-intensity physical activity per week. Can consider adding in two  days weekly of resistance training.  - Follow-up as scheduled for the group classes with Dr. Nury Saenz.  - Follow-up with nutrition as scheduled.      Nutrition Monitoring & Evaluation: Weight loss of 1-2 lbs per week and adherence to recommendations and patient stated goals    Need for follow-up: As scheduled for Dr. Cardenas's Shared Medical Appointment (SMA) and Individual Nutrition Visit in 4-6 weeks    Referred by: Dr. Nury Saenz    [facility] Billing Type: Medical Nutrition Assessment, each 15 min increment, for 3 increments.    SIGNATURE:   Annmarie Denton RD, LD, CDCES                                                             DATE:   4/9/2024

## 2024-04-23 ENCOUNTER — HOSPITAL ENCOUNTER (OUTPATIENT)
Dept: RADIOLOGY | Facility: CLINIC | Age: 44
Discharge: HOME | End: 2024-04-23
Payer: COMMERCIAL

## 2024-04-23 DIAGNOSIS — E66.8 CONSTITUTIONAL OBESITY: ICD-10-CM

## 2024-04-23 PROCEDURE — 76536 US EXAM OF HEAD AND NECK: CPT | Performed by: RADIOLOGY

## 2024-04-23 PROCEDURE — 76536 US EXAM OF HEAD AND NECK: CPT

## 2024-04-25 ENCOUNTER — TELEPHONE (OUTPATIENT)
Dept: ENDOCRINOLOGY | Facility: CLINIC | Age: 44
End: 2024-04-25
Payer: COMMERCIAL

## 2024-04-25 DIAGNOSIS — E04.2 MULTIPLE THYROID NODULES: Primary | ICD-10-CM

## 2024-04-25 NOTE — TELEPHONE ENCOUNTER
US THYROID;  4/23/2024    COMPARISON:  None.    FINDINGS:  PARENCHYMA:  Homogeneous.      SIZE:  RIGHT LOBE: 6.1 x 1.8 x 2.1 cm.  LEFT LOBE: 5.7 x 1.7 x 1.5 cm.  ISTHMUS: 0.3 cm.      NODULES: (Please note, assessment and description of nodules is per  TI-RADS criteria. Up to 4 total nodules described, which includes  largest and/or most clinically significant based on morphology.) It  is noted that some spongiform and/or cystic nodules may not be  specifically described and are TR category 1 (benign).      Scattered subcentimeter cystic and spongiform nodules compatible a  TIRADS 1 nodules. No suspicious nodule seen.      CERVICAL LYMPH NODES:  No enlarged or morphologically abnormal cervical nodes are seen.      IMPRESSION:  1. Multinodular goiter.  2. No suspicious nodules are detected.        TI-RADS grading system. ACR TI-RADS recommendations:      TR5 greater than or equal to 7 points) highly suspicious - FNA if  greater than or equal to 1cm, follow-up if 0.5 -0.9 cm every year for  5 years. Aggregate cancer risk 35%. TR4 (4-6 points) moderately  suspicious - FNA if greater than or equal to 1.5cm, follow-up if 1  -1.4 cm in 1, 2, 3 and 5 years. Aggregate cancer risk 9.1% TR3 (3  points) mildly suspicious - FNA if greater than or equal to 2.5cm,  follow-up if 1.5 -2.4 cm in 1, 3 and 5 years. Aggregate cancer risk  4.8% TR2 (2 points) not suspicious. Aggregate cancer risk 1.5%  TR1 (0 points) benign - No FNA or follow-up. Aggregate cancer risk  0.3%      Signed by: Gideon Rendon 4/25/2024 12:47 AM  -----------------------------------------------------------------------------------    Please let her know that the thyroid US showed a few tiny nodules, none appear suspicious.  She can repeat an US in a year again.  Revital MARILEE Saenz MD

## 2024-06-04 ENCOUNTER — APPOINTMENT (OUTPATIENT)
Dept: ENDOCRINOLOGY | Facility: CLINIC | Age: 44
End: 2024-06-04
Payer: COMMERCIAL

## 2024-08-14 ENCOUNTER — APPOINTMENT (OUTPATIENT)
Dept: ENDOCRINOLOGY | Facility: CLINIC | Age: 44
End: 2024-08-14
Payer: COMMERCIAL

## 2024-08-15 ENCOUNTER — OFFICE VISIT (OUTPATIENT)
Dept: PRIMARY CARE | Facility: CLINIC | Age: 44
End: 2024-08-15
Payer: COMMERCIAL

## 2024-08-15 ENCOUNTER — LAB (OUTPATIENT)
Dept: LAB | Facility: LAB | Age: 44
End: 2024-08-15
Payer: COMMERCIAL

## 2024-08-15 VITALS
BODY MASS INDEX: 57.22 KG/M2 | DIASTOLIC BLOOD PRESSURE: 83 MMHG | RESPIRATION RATE: 16 BRPM | SYSTOLIC BLOOD PRESSURE: 137 MMHG | WEIGHT: 293 LBS | OXYGEN SATURATION: 96 % | HEART RATE: 86 BPM

## 2024-08-15 DIAGNOSIS — E55.9 VITAMIN D DEFICIENCY: ICD-10-CM

## 2024-08-15 DIAGNOSIS — D89.89 KAPPA LIGHT CHAIN DISEASE (MULTI): ICD-10-CM

## 2024-08-15 DIAGNOSIS — Z12.31 VISIT FOR SCREENING MAMMOGRAM: ICD-10-CM

## 2024-08-15 DIAGNOSIS — E66.01 CLASS 3 SEVERE OBESITY DUE TO EXCESS CALORIES WITHOUT SERIOUS COMORBIDITY WITH BODY MASS INDEX (BMI) OF 50.0 TO 59.9 IN ADULT (MULTI): ICD-10-CM

## 2024-08-15 DIAGNOSIS — D64.9 ANEMIA, UNSPECIFIED TYPE: ICD-10-CM

## 2024-08-15 DIAGNOSIS — N94.6 DYSMENORRHEA: Primary | ICD-10-CM

## 2024-08-15 DIAGNOSIS — N94.6 DYSMENORRHEA: ICD-10-CM

## 2024-08-15 DIAGNOSIS — R73.03 PREDIABETES: ICD-10-CM

## 2024-08-15 DIAGNOSIS — Z12.11 ENCOUNTER FOR SCREENING FOR MALIGNANT NEOPLASM OF COLON: ICD-10-CM

## 2024-08-15 LAB
25(OH)D3 SERPL-MCNC: 13 NG/ML (ref 30–100)
ALBUMIN SERPL BCP-MCNC: 3.9 G/DL (ref 3.4–5)
ALP SERPL-CCNC: 84 U/L (ref 33–110)
ALT SERPL W P-5'-P-CCNC: 21 U/L (ref 7–45)
ANION GAP SERPL CALC-SCNC: 11 MMOL/L (ref 10–20)
AST SERPL W P-5'-P-CCNC: 17 U/L (ref 9–39)
BILIRUB SERPL-MCNC: 0.3 MG/DL (ref 0–1.2)
BUN SERPL-MCNC: 7 MG/DL (ref 6–23)
CALCIUM SERPL-MCNC: 9.2 MG/DL (ref 8.6–10.6)
CHLORIDE SERPL-SCNC: 105 MMOL/L (ref 98–107)
CHOLEST SERPL-MCNC: 175 MG/DL (ref 0–199)
CHOLESTEROL/HDL RATIO: 3.7
CO2 SERPL-SCNC: 27 MMOL/L (ref 21–32)
CREAT SERPL-MCNC: 0.62 MG/DL (ref 0.5–1.05)
EGFRCR SERPLBLD CKD-EPI 2021: >90 ML/MIN/1.73M*2
ERYTHROCYTE [DISTWIDTH] IN BLOOD BY AUTOMATED COUNT: 19.1 % (ref 11.5–14.5)
EST. AVERAGE GLUCOSE BLD GHB EST-MCNC: 128 MG/DL
ESTRADIOL SERPL-MCNC: 376 PG/ML
FERRITIN SERPL-MCNC: 55 NG/ML (ref 8–150)
FSH SERPL-ACNC: 5.9 IU/L
GLUCOSE SERPL-MCNC: 111 MG/DL (ref 74–99)
HBA1C MFR BLD: 6.1 %
HCT VFR BLD AUTO: 34 % (ref 36–46)
HDLC SERPL-MCNC: 47.3 MG/DL
HGB BLD-MCNC: 10 G/DL (ref 12–16)
IRON SATN MFR SERPL: 8 % (ref 25–45)
IRON SERPL-MCNC: 31 UG/DL (ref 35–150)
LDLC SERPL CALC-MCNC: 109 MG/DL
LH SERPL-ACNC: 12.1 IU/L
MCH RBC QN AUTO: 21.6 PG (ref 26–34)
MCHC RBC AUTO-ENTMCNC: 29.4 G/DL (ref 32–36)
MCV RBC AUTO: 74 FL (ref 80–100)
NON HDL CHOLESTEROL: 128 MG/DL (ref 0–149)
NRBC BLD-RTO: 0 /100 WBCS (ref 0–0)
PLATELET # BLD AUTO: 533 X10*3/UL (ref 150–450)
POTASSIUM SERPL-SCNC: 4.3 MMOL/L (ref 3.5–5.3)
PROT SERPL-MCNC: 7.3 G/DL (ref 6.4–8.2)
RBC # BLD AUTO: 4.62 X10*6/UL (ref 4–5.2)
SODIUM SERPL-SCNC: 139 MMOL/L (ref 136–145)
TIBC SERPL-MCNC: 399 UG/DL (ref 240–445)
TRIGL SERPL-MCNC: 94 MG/DL (ref 0–149)
TSH SERPL-ACNC: 1.78 MIU/L (ref 0.44–3.98)
UIBC SERPL-MCNC: 368 UG/DL (ref 110–370)
VIT B12 SERPL-MCNC: 321 PG/ML (ref 211–911)
VLDL: 19 MG/DL (ref 0–40)
WBC # BLD AUTO: 9 X10*3/UL (ref 4.4–11.3)

## 2024-08-15 PROCEDURE — 83001 ASSAY OF GONADOTROPIN (FSH): CPT

## 2024-08-15 PROCEDURE — 82728 ASSAY OF FERRITIN: CPT

## 2024-08-15 PROCEDURE — 83540 ASSAY OF IRON: CPT

## 2024-08-15 PROCEDURE — 82670 ASSAY OF TOTAL ESTRADIOL: CPT

## 2024-08-15 PROCEDURE — 83036 HEMOGLOBIN GLYCOSYLATED A1C: CPT

## 2024-08-15 PROCEDURE — 85027 COMPLETE CBC AUTOMATED: CPT

## 2024-08-15 PROCEDURE — 82306 VITAMIN D 25 HYDROXY: CPT

## 2024-08-15 PROCEDURE — 80061 LIPID PANEL: CPT

## 2024-08-15 PROCEDURE — 1036F TOBACCO NON-USER: CPT | Performed by: FAMILY MEDICINE

## 2024-08-15 PROCEDURE — 82607 VITAMIN B-12: CPT

## 2024-08-15 PROCEDURE — 83002 ASSAY OF GONADOTROPIN (LH): CPT

## 2024-08-15 PROCEDURE — 99214 OFFICE O/P EST MOD 30 MIN: CPT | Performed by: FAMILY MEDICINE

## 2024-08-15 PROCEDURE — 84443 ASSAY THYROID STIM HORMONE: CPT

## 2024-08-15 PROCEDURE — 80053 COMPREHEN METABOLIC PANEL: CPT

## 2024-08-15 PROCEDURE — 36415 COLL VENOUS BLD VENIPUNCTURE: CPT

## 2024-08-15 PROCEDURE — 83550 IRON BINDING TEST: CPT

## 2024-08-15 NOTE — PROGRESS NOTES
Subjective   Patient ID: Ernestina Naylor is a 44 y.o. female who presents for Dysmenorrhea.    HPI the patient presents today with complaints of dysmenorrhea.  She states that over the last 2 to 3 years she has gained a lot of weight.  She had a normal cycle 4 months ago and then had a light.  3 months ago.  She had no cycle then and then started bleeding over 2 weeks ago which has persisted.  She has a lot of clotting and heavy bleeding.  She is concerned she might be perimenopausal.      Review of Systems  Constitutional: No fever or chills  Cardiovascular: no chest pain, no palpitations and no syncope.   Respiratory: no cough, no shortness of breath during exertion and no shortness of breath at rest.   Gastrointestinal: no abdominal pain, no nausea and no vomiting.  Neuro: No Headache, no dizziness    Objective   /83 (BP Location: Left arm, Patient Position: Sitting, BP Cuff Size: Large adult)   Pulse 86   Resp 16   Wt 147 kg (323 lb)   SpO2 96%   BMI 57.22 kg/m²     Physical Exam  Constitutional: Alert and in no acute distress. Well developed, well nourished  Head and Face: Head and face: Normal.    Cardiovascular: Heart rate and rhythm were normal, normal S1 and S2. No peripheral edema.   Pulmonary: No respiratory distress. Clear bilateral breath sounds.  Musculoskeletal: Gait and station: Normal. Muscle strength/tone: Normal.   Skin: Normal skin color and pigmentation, normal skin turgor, and no rash.    Psychiatric: Judgment and insight: Intact. Mood and affect: Normal.    Lab Results   Component Value Date    WBC 9.6 07/20/2023    HGB 11.4 (L) 07/20/2023    HCT 38.0 07/20/2023     (H) 07/20/2023    CHOL 178 06/22/2023    TRIG 90 06/22/2023    HDL 51.7 06/22/2023    ALT 22 06/22/2023    AST 24 06/22/2023     06/22/2023    K 4.6 06/22/2023     06/22/2023    CREATININE 0.70 06/22/2023    BUN 7 06/22/2023    CO2 23 06/22/2023    TSH 1.25 06/22/2023    HGBA1C 5.9 (A) 06/22/2023       US  thyroid  Narrative: Interpreted By:  Gideon Rendon,   STUDY:  US THYROID;  4/23/2024 9:16 am      INDICATION:  Signs/Symptoms:enlarged.      COMPARISON:  None.      ACCESSION NUMBER(S):  [id number]      ORDERING CLINICIAN:  MADDIE DOWLING      TECHNIQUE:  Multiple ultrasonographic images of the thyroid gland and surrounding  tissues were obtained.      FINDINGS:  PARENCHYMA:  Homogeneous.      SIZE:  RIGHT LOBE:  6.1 x 1.8 x 2.1 cm.  LEFT LOBE:  5.7 x 1.7 x 1.5 cm.  ISTHMUS:  0.3 cm.      NODULES: (Please note, assessment and description of nodules is per  TI-RADS criteria. Up to 4 total nodules described, which includes  largest and/or most clinically significant based on morphology.) It  is noted that some spongiform and/or cystic nodules may not be  specifically described and are TR category 1 (benign).      Scattered subcentimeter cystic and spongiform nodules compatible a  TIRADS 1 nodules. No suspicious nodule seen.      CERVICAL LYMPH NODES:  No enlarged or morphologically abnormal cervical nodes are seen.      Impression: 1. Multinodular goiter.  2. No suspicious nodules are detected.                  Please note that these statements are based on the recommendations of  the [employer]      TI-RADS grading system. ACR TI-RADS recommendations:      TR5 greater than or equal to 7 points) highly suspicious - FNA if  greater than or equal to 1cm, follow-up if 0.5 -0.9 cm every year for  5 years. Aggregate cancer risk 35%. TR4 (4-6 points) moderately  suspicious - FNA if greater than or equal to 1.5cm, follow-up if 1  -1.4 cm in 1, 2, 3 and 5 years. Aggregate cancer risk 9.1% TR3 (3  points) mildly suspicious - FNA if greater than or equal to 2.5cm,  follow-up if 1.5 -2.4 cm in 1, 3 and 5 years. Aggregate cancer risk  4.8% TR2 (2 points) not suspicious. Aggregate cancer risk 1.5%  TR1 (0 points) benign - No FNA or follow-up. Aggregate cancer risk  0.3%      Signed by: Gideon Rendon  4/25/2024 12:47 AM  Dictation workstation:   [id number]      Assessment/Plan   Diagnoses and all orders for this visit:  Dysmenorrhea  -     US PELVIS TRANSABDOMINAL WITH TRANSVAGINAL; Future  -     FSH; Future  -     Luteinizing hormone; Future  -     Estradiol; Future  -     Referral to Gynecology; Future  Vitamin D deficiency  -     Vitamin D 25-Hydroxy,Total (for eval of Vitamin D levels); Future  Prediabetes  -     Albumin-Creatinine Ratio, Urine Random; Future  -     Comprehensive Metabolic Panel; Future  -     Hemoglobin A1C; Future  Anemia, unspecified type  -     CBC; Future  -     Ferritin; Future  -     Iron and TIBC; Future  -     Vitamin B12; Future  Visit for screening mammogram  -     BI mammo bilateral screening tomosynthesis; Future  Encounter for screening for malignant neoplasm of colon  -     Colonoscopy Screening; Average Risk Patient; Future  Class 3 severe obesity due to excess calories without serious comorbidity with body mass index (BMI) of 50.0 to 59.9 in adult (Multi)  -     Lipid Panel; Future  -     TSH with reflex to Free T4 if abnormal; Future  Kappa light chain disease (Multi)        Dear Ernestina Naylor     It was my pleasure to take care of you today in the office. Below are the things we discussed today:    1.  Heavy menstrual cycle with irregularity, concern for perimenopausal symptoms versus fibroids.  Advised patient we will order labs and get a pelvic ultrasound done.  Gave her a referral to see gynecology.    2.  Concern for anemia with thrombocytosis and history of kappa lambda light chain, advised patient we will recheck labs and likely she will need to follow-up with hematology.    3.  Ordered mammogram and colonoscopy    Please call me if any questions arise from now until your next visit. I will call you after I am done seeing patients. A Doctor is always available by phone when the office is closed. Please feel free to call for help with any problem that you feel  shouldn't wait until the office re-opens.     Mackenzie Escamilla MD

## 2024-08-15 NOTE — PATIENT INSTRUCTIONS
Scheduling Radiology tests: [phone]      Scheduling Colonoscopy: [phone]    Scheduling all other appointments: [phone]    If you need labs done, please go to any  lab, no paperwork needed. If a Lipid panel is ordered, you will need to fast overnight, other blood work does not require fasting.   Lab in this building is in [address] (M-F 7:30-5:00pm and Sat 8-12pm)    Please call me if any questions arise from now until your next visit. I will call you after I am done seeing patients. A Doctor is always available by phone when the office is closed. Please feel free to call for help with any problem that you feel shouldn't wait until the office re-opens.

## 2024-08-22 ENCOUNTER — HOSPITAL ENCOUNTER (OUTPATIENT)
Dept: RADIOLOGY | Facility: CLINIC | Age: 44
Discharge: HOME | End: 2024-08-22
Payer: COMMERCIAL

## 2024-08-22 VITALS — HEIGHT: 63 IN | BODY MASS INDEX: 51.91 KG/M2 | WEIGHT: 293 LBS

## 2024-08-22 DIAGNOSIS — N94.6 DYSMENORRHEA: ICD-10-CM

## 2024-08-22 DIAGNOSIS — Z12.31 VISIT FOR SCREENING MAMMOGRAM: ICD-10-CM

## 2024-08-22 PROCEDURE — 77067 SCR MAMMO BI INCL CAD: CPT

## 2024-08-22 PROCEDURE — 76856 US EXAM PELVIC COMPLETE: CPT

## 2024-09-16 ENCOUNTER — APPOINTMENT (OUTPATIENT)
Dept: OBSTETRICS AND GYNECOLOGY | Facility: CLINIC | Age: 44
End: 2024-09-16
Payer: COMMERCIAL

## 2024-09-18 ENCOUNTER — APPOINTMENT (OUTPATIENT)
Dept: OBSTETRICS AND GYNECOLOGY | Facility: CLINIC | Age: 44
End: 2024-09-18
Payer: COMMERCIAL

## 2024-09-18 VITALS — DIASTOLIC BLOOD PRESSURE: 100 MMHG | WEIGHT: 293 LBS | BODY MASS INDEX: 59.71 KG/M2 | SYSTOLIC BLOOD PRESSURE: 144 MMHG

## 2024-09-18 DIAGNOSIS — N94.6 DYSMENORRHEA: ICD-10-CM

## 2024-09-18 DIAGNOSIS — N93.9 ABNORMAL UTERINE BLEEDING (AUB): Primary | ICD-10-CM

## 2024-09-18 PROCEDURE — 88305 TISSUE EXAM BY PATHOLOGIST: CPT

## 2024-09-18 PROCEDURE — 88305 TISSUE EXAM BY PATHOLOGIST: CPT | Performed by: STUDENT IN AN ORGANIZED HEALTH CARE EDUCATION/TRAINING PROGRAM

## 2024-09-18 PROCEDURE — 1036F TOBACCO NON-USER: CPT | Performed by: OBSTETRICS & GYNECOLOGY

## 2024-09-18 PROCEDURE — 99213 OFFICE O/P EST LOW 20 MIN: CPT | Performed by: OBSTETRICS & GYNECOLOGY

## 2024-09-18 RX ORDER — FERROUS SULFATE 325(65) MG
65 TABLET, DELAYED RELEASE (ENTERIC COATED) ORAL
COMMUNITY

## 2024-09-18 ASSESSMENT — ENCOUNTER SYMPTOMS
CHILLS: 0
CONSTIPATION: 0
DYSURIA: 0
BACK PAIN: 0
FEVER: 0
HEMATURIA: 0
FLANK PAIN: 0
APPETITE CHANGE: 0
UNEXPECTED WEIGHT CHANGE: 0
VOMITING: 0
DIARRHEA: 0
BLOOD IN STOOL: 0
SLEEP DISTURBANCE: 0
SHORTNESS OF BREATH: 0
FREQUENCY: 0
COLOR CHANGE: 0
NAUSEA: 0
FATIGUE: 0
ABDOMINAL PAIN: 0
ABDOMINAL DISTENTION: 0

## 2024-09-18 ASSESSMENT — PAIN SCALES - GENERAL: PAINLEVEL: 0-NO PAIN

## 2024-09-18 NOTE — PROGRESS NOTES
Ernestina Naylor is a 44 y.o.  here for recent irregular cycles    Medical and surgical histories reviewed with patient.     Pt reports regular menstrual cycles until August.  In August she had a cycle that was very heavy and lasted 2 weeks.  Her PCP ordered an US which she completed just as the cycle was coming to an end.  She has not had a cycle since that time.     Pt also concerned about weight gain and wondering if it is directly related to perimenopause.   She has gained about 80 lbs (unsure over what time period).   She is not exercising.     She is up to date on pap smear and mammogram.   Sexually active with one partner/ although infrequent. She has had a tubal ligation.   Patient's last menstrual period was 2024 (exact date).     Obstetric History  OB History    Para Term  AB Living   3 3 3     3   SAB IAB Ectopic Multiple Live Births                  # Outcome Date GA Lbr Fred/2nd Weight Sex Type Anes PTL Lv   3 Term            2 Term            1 Term                 Past Medical History  She has a past medical history of Body mass index (BMI) 50.0-59.9, adult (Multi) (2018).    Surgical History  She has a past surgical history that includes Other surgical history (2022); Other surgical history (2022); Other surgical history (2022);  section, low transverse (3/17/2002 2006 2009); and Tubal ligation (2009).     Social History  She reports that she quit smoking about 26 years ago. Her smoking use included cigarettes. She has been exposed to tobacco smoke. She has never used smokeless tobacco. She reports current alcohol use of about 3.0 standard drinks of alcohol per week. She reports that she does not use drugs.    Family History  Family History   Problem Relation Name Age of Onset    Hypertension Mother Mother     Heart disease Maternal Grandmother Grandma     Heart disease Mother's Brother Uncle          Allergies  Codeine    Review of Systems   Constitutional:  Negative for appetite change, chills, fatigue, fever and unexpected weight change.   Respiratory:  Negative for shortness of breath.    Cardiovascular:  Negative for chest pain.   Gastrointestinal:  Negative for abdominal distention, abdominal pain, blood in stool, constipation, diarrhea, nausea and vomiting.   Endocrine: Negative for cold intolerance and heat intolerance.   Genitourinary:  Negative for dyspareunia, dysuria, flank pain, frequency, genital sores, hematuria, menstrual problem, pelvic pain, urgency, vaginal bleeding, vaginal discharge and vaginal pain.   Musculoskeletal:  Negative for back pain.   Skin:  Negative for color change.   Psychiatric/Behavioral:  Negative for sleep disturbance.        BP (!) 144/100 (BP Location: Left arm, Patient Position: Sitting)   Wt (!) 153 kg (337 lb)   LMP 08/31/2024 (Exact Date)   BMI 59.71 kg/m²      Physical Exam  Constitutional:       Appearance: Normal appearance.   Abdominal:      General: Abdomen is flat.      Palpations: Abdomen is soft.      Tenderness: There is no abdominal tenderness.   Genitourinary:     General: Normal vulva.      Vagina: Normal.      Cervix: Normal.      Uterus: Normal.       Adnexa: Right adnexa normal and left adnexa normal.   Neurological:      Mental Status: She is alert.   Psychiatric:         Mood and Affect: Mood normal.         Behavior: Behavior normal.           Assessment and Plan:    AUB  Likely related to perimenopause. We discussed perimenopause and possible symptoms.  We discussed that some women will notice some mild weight gain or difficulty losing weight as a result of menopause however patient's weight gain is unlikely to be solely related to perimenopause.   We attempted EMBx today and got small amount of tissue. Pt will cont to monitor cycles over the next few months and will schedule a follow up appointment to review.         No orders of the defined  types were placed in this encounter.

## 2024-09-24 LAB
LABORATORY COMMENT REPORT: NORMAL
PATH REPORT.FINAL DX SPEC: NORMAL
PATH REPORT.GROSS SPEC: NORMAL
PATH REPORT.RELEVANT HX SPEC: NORMAL
PATH REPORT.TOTAL CANCER: NORMAL

## 2024-09-25 ENCOUNTER — PATIENT MESSAGE (OUTPATIENT)
Dept: PRIMARY CARE | Facility: CLINIC | Age: 44
End: 2024-09-25
Payer: COMMERCIAL

## 2024-09-25 DIAGNOSIS — Z01.89 PATIENT REQUEST FOR DIAGNOSTIC TESTING: Primary | ICD-10-CM

## 2024-09-30 ENCOUNTER — LAB (OUTPATIENT)
Dept: LAB | Facility: LAB | Age: 44
End: 2024-09-30
Payer: COMMERCIAL

## 2024-09-30 DIAGNOSIS — Z01.89 PATIENT REQUEST FOR DIAGNOSTIC TESTING: ICD-10-CM

## 2024-09-30 LAB
ABO GROUP (TYPE) IN BLOOD: NORMAL
ANTIBODY SCREEN: NORMAL
RH FACTOR (ANTIGEN D): NORMAL

## 2024-09-30 PROCEDURE — 86901 BLOOD TYPING SEROLOGIC RH(D): CPT

## 2024-09-30 PROCEDURE — 36415 COLL VENOUS BLD VENIPUNCTURE: CPT

## 2024-09-30 PROCEDURE — 86077 PHYS BLOOD BANK SERV XMATCH: CPT | Performed by: FAMILY MEDICINE

## 2024-09-30 PROCEDURE — 86850 RBC ANTIBODY SCREEN: CPT

## 2024-09-30 PROCEDURE — 86900 BLOOD TYPING SEROLOGIC ABO: CPT

## 2024-10-01 LAB
BB ANTIBODY IDENTIFICATION: NORMAL
CASE #: NORMAL
PATH REV-IMMUNOHEMATOLOGY-PR30: NORMAL

## 2025-01-15 ENCOUNTER — APPOINTMENT (OUTPATIENT)
Dept: PRIMARY CARE | Facility: CLINIC | Age: 45
End: 2025-01-15
Payer: COMMERCIAL

## 2025-01-17 ENCOUNTER — APPOINTMENT (OUTPATIENT)
Dept: OBSTETRICS AND GYNECOLOGY | Facility: CLINIC | Age: 45
End: 2025-01-17
Payer: COMMERCIAL

## 2025-01-21 NOTE — PROGRESS NOTES
"Subjective   Patient ID: Ernestina Naylor is a 44 y.o. female who presents for Annual Exam.    Last Annual Physical: July 2023  Last Dental Visit: Up-to-date  Last Eye exam: Appointment coming up next week  Hearing Concerns: No   Diet: Well-balanced   Exercise Routine: Walks for exercises      HPI     The patient presents to clinic today for blood pressure check. Currently, she is not on any prescribed medications. She takes vitamin D3, elderberry, ferrous sulfate and magnesium.     She follows up with Rheumatology for her light chain disease. She followed up with Dr. Danielson for dysmenorrhea which is possibly related to perimenopause and her weight gain is related to that.     She states she is on semaglutide for 2 months and has lost 20 lbs. She endorses constipation however on this. She has not had a bowel movement over the past week. She is utilizing MiraLax which has been effective.      Review of Systems  Constitutional: No fever or chills, No Night Sweats  Eyes: No Blurry Vision or Eye sight problems  ENT: No Nasal Discharge, Hoarseness, sore throat  Cardiovascular: no chest pain, no palpitations and no syncope.   Respiratory: no cough, no shortness of breath during exertion and no shortness of breath at rest.   Gastrointestinal: no abdominal pain, no nausea and no vomiting. + constipation  : No vaginal discharge, burning with urination, no blood in urine or stools  Skin: No Skin rashes or Lesions  Neuro: No Headache, no dizziness or Numbness or tingling  Psych: No Anxiety, depression or sleeping problems  Heme: No Easy bleeding or brusing.     Objective   /84   Pulse 89   Ht 1.6 m (5' 3\")   Wt 146 kg (321 lb)   SpO2 99%   BMI 56.86 kg/m²     Physical Exam  Constitutional: Alert and in no acute distress. Well developed, well nourished.   Head and Face: Head and face: Normal.    Eyes: Normal external exam. Pupils were equal in size, round, reactive to light (PERRL) with normal accommodation and " extraocular movements intact (EOMI).   Ears, Nose, Mouth, and Throat: External inspection of ears and nose: Normal.  Hearing: Normal.  Nasal mucosa, septum, and turbinates: Normal.  Lips, teeth, and gums: Normal.  Oropharynx: Normal.   Neck: No neck mass was observed. Supple. Thyroid not enlarged and there were no palpable thyroid nodules.   Cardiovascular: Heart rate and rhythm were normal, normal S1 and S2. Pedal pulses: Normal. No peripheral edema.   Pulmonary: No respiratory distress. Clear bilateral breath sounds.   Abdomen: Soft nontender; no abdominal mass palpated. Normal bowel sounds. No organomegaly.   Musculoskeletal: No joint swelling seen, normal movements of all extremities. Range of motion: Normal.  Muscle strength/tone: Normal.    Skin: Normal skin color and pigmentation, normal skin turgor, and no rash.   Neurologic: Deep tendon reflexes were 2+ and symmetric.   Psychiatric: Judgment and insight: Intact. Mood and affect: Normal.  Lymphatic: No cervical lymphadenopathy. Palpation of lymph nodes in axillae: Normal.  Palpation of lymph nodes in groin: Normal.    Lab Results   Component Value Date    WBC 9.0 08/15/2024    HGB 10.0 (L) 08/15/2024    HCT 34.0 (L) 08/15/2024     (H) 08/15/2024    CHOL 175 08/15/2024    TRIG 94 08/15/2024    HDL 47.3 08/15/2024    ALT 21 08/15/2024    AST 17 08/15/2024     08/15/2024    K 4.3 08/15/2024     08/15/2024    CREATININE 0.62 08/15/2024    BUN 7 08/15/2024    CO2 27 08/15/2024    TSH 1.78 08/15/2024    HGBA1C 6.1 (H) 08/15/2024       BI mammo bilateral screening tomosynthesis  Narrative: Interpreted By:  Marie Asencio,   STUDY:  BI MAMMO BILATERAL SCREENING TOMOSYNTHESIS;  8/22/2024 7:24 am      ACCESSION NUMBER(S):  [id number]      ORDERING CLINICIAN:  DINA MANN      INDICATION:  Screening.      COMPARISON:  02/20/2023, 02/11/2022      FINDINGS:  2D and tomosynthesis images were reviewed at 1 mm slice thickness.      Density:  There are  areas of scattered fibroglandular tissue.      No suspicious masses or calcifications are identified.      Impression: No mammographic evidence of malignancy.      BI-RADS CATEGORY:  BI-RADS Category:  1 Negative.  Recommendation:  Annual Screening.  Recommended Date:  1 Year.  Laterality:  Bilateral.              For any future breast imaging appointments, please call [phone]  ([phone]).          MACRO:  None      Signed by: Marie Asencio 8/26/2024 4:34 PM  Dictation workstation:   [id number]      Assessment/Plan   Diagnoses and all orders for this visit:  Annual physical exam  -     CBC; Future  -     Comprehensive Metabolic Panel; Future  -     Hemoglobin A1C; Future  -     Lipid Panel; Future  -     TSH with reflex to Free T4 if abnormal; Future  Class 3 severe obesity due to excess calories without serious comorbidity with body mass index (BMI) of 50.0 to 59.9 in adult  Encounter for screening for malignant neoplasm of colon  -     Colonoscopy Screening; Average Risk Patient; Future  Kappa light chain disease (Multi)  Vitamin D deficiency  -     Vitamin B12; Future  -     Vitamin D 25-Hydroxy,Total (for eval of Vitamin D levels); Future  Visit for screening mammogram  -     BI mammo bilateral screening tomosynthesis; Future        Dear Ernestina Naylor     It was my pleasure to take care of you today in the office. Below are the things we discussed today:    1. 1. Immunizations: Yearly Flu shot is recommended. Declined         a: COVID: Patient declined booster          b: Tetanus: Up-to-date last in 2023             2. Blood Work: Ordered    3. Seen your dentist twice a year  4. Yearly Eye exam is recommended    5. BMI: Obese  6: Diet recommendations:   Eat Clean, Try to have as many home cooked meals as possible  Avoid processed foods which contain excess calories, sugar, and sodium.    7. Exercise recommendations:   150 minutes a week to maintain your weight     If you have to loose weight, you need a  better diet and exercise plan.     8. Supplements recommended:  a - Calcium 600 mg up to twice a day to get a total of 1200 mg. Each 8 oz of milk or yogurt or 1 oz of cheese, 1 Banana, 1 serving of green Leafy vegetable has about 300 mg of Calcium, so you may subtract that amount. Calcium citrate is the only acceptable supplement to take if you take an acid suppressing medication like Prilosec; otherwise Calcium carbonate is acceptable too (It can cause Constipation).   b - Vitamin D - 2000 IU daily     9. Please get your Living will / Advance directive completed if you do not have one already. Please make sure our office has a copy of the latest one.     10. Colonoscopy: Ordered in August, not done. Ordered again.  11. Mammogram: Uptodate, Repeat 8/22/2025  12. PAP: Last PAP done in Jan 2022. Cytology and HPV were negative.   13. Skin Check: Please see Dermatology once a year for a Skin Check.     14. Constipation: Continue MiraLax; let me know if no improvement     15. Kappa light chain disease: Stable. Follows up with Hematology.    16. Class 3 obesity: She is on semaglutide, has lost 20 lbs in 2 months    Follow up in one year for a Physical. Please call the office before your Physical to see if you need blood work completed prior to your physical.     Please call me if any questions arise from now until your next visit. I will call you after I am done seeing patients. A Doctor is always available by phone when the office is closed. Please feel free to call for help with any problem that you feel shouldn't wait until the office re-opens.   Scribe Attestation  By signing my name below, IChai Scribe   attest that this documentation has been prepared under the direction and in the presence of Mackenzie Escamilla MD.

## 2025-01-24 ENCOUNTER — APPOINTMENT (OUTPATIENT)
Dept: PRIMARY CARE | Facility: CLINIC | Age: 45
End: 2025-01-24
Payer: COMMERCIAL

## 2025-01-24 VITALS
SYSTOLIC BLOOD PRESSURE: 130 MMHG | DIASTOLIC BLOOD PRESSURE: 84 MMHG | OXYGEN SATURATION: 99 % | WEIGHT: 293 LBS | HEIGHT: 63 IN | HEART RATE: 89 BPM | BODY MASS INDEX: 51.91 KG/M2

## 2025-01-24 DIAGNOSIS — D89.89 KAPPA LIGHT CHAIN DISEASE (MULTI): ICD-10-CM

## 2025-01-24 DIAGNOSIS — Z12.31 VISIT FOR SCREENING MAMMOGRAM: ICD-10-CM

## 2025-01-24 DIAGNOSIS — Z12.11 ENCOUNTER FOR SCREENING FOR MALIGNANT NEOPLASM OF COLON: ICD-10-CM

## 2025-01-24 DIAGNOSIS — E66.01 CLASS 3 SEVERE OBESITY DUE TO EXCESS CALORIES WITHOUT SERIOUS COMORBIDITY WITH BODY MASS INDEX (BMI) OF 50.0 TO 59.9 IN ADULT: ICD-10-CM

## 2025-01-24 DIAGNOSIS — E55.9 VITAMIN D DEFICIENCY: ICD-10-CM

## 2025-01-24 DIAGNOSIS — E66.813 CLASS 3 SEVERE OBESITY DUE TO EXCESS CALORIES WITHOUT SERIOUS COMORBIDITY WITH BODY MASS INDEX (BMI) OF 50.0 TO 59.9 IN ADULT: ICD-10-CM

## 2025-01-24 DIAGNOSIS — Z00.00 ANNUAL PHYSICAL EXAM: Primary | ICD-10-CM

## 2025-01-24 PROCEDURE — 1036F TOBACCO NON-USER: CPT | Performed by: FAMILY MEDICINE

## 2025-01-24 PROCEDURE — 3008F BODY MASS INDEX DOCD: CPT | Performed by: FAMILY MEDICINE

## 2025-01-24 PROCEDURE — 99396 PREV VISIT EST AGE 40-64: CPT | Performed by: FAMILY MEDICINE

## 2025-01-24 ASSESSMENT — COLUMBIA-SUICIDE SEVERITY RATING SCALE - C-SSRS
1. IN THE PAST MONTH, HAVE YOU WISHED YOU WERE DEAD OR WISHED YOU COULD GO TO SLEEP AND NOT WAKE UP?: NO
2. HAVE YOU ACTUALLY HAD ANY THOUGHTS OF KILLING YOURSELF?: NO